# Patient Record
Sex: FEMALE
[De-identification: names, ages, dates, MRNs, and addresses within clinical notes are randomized per-mention and may not be internally consistent; named-entity substitution may affect disease eponyms.]

---

## 2018-09-10 ENCOUNTER — HOSPITAL ENCOUNTER (OUTPATIENT)
Dept: HOSPITAL 9 - MADCT | Age: 58
Discharge: HOME | End: 2018-09-10
Payer: COMMERCIAL

## 2018-09-10 DIAGNOSIS — M25.552: ICD-10-CM

## 2018-09-10 DIAGNOSIS — M53.3: ICD-10-CM

## 2018-09-10 DIAGNOSIS — Z85.3: ICD-10-CM

## 2018-09-10 DIAGNOSIS — M25.512: Primary | ICD-10-CM

## 2018-09-10 DIAGNOSIS — M54.9: ICD-10-CM

## 2018-09-10 PROCEDURE — 71260 CT THORAX DX C+: CPT

## 2018-09-10 PROCEDURE — 74177 CT ABD & PELVIS W/CONTRAST: CPT

## 2018-09-10 NOTE — CT
CT CHEST WITH CONTRAST:

 

CT ABDOMEN AND PELVIS WITH CONTRAST:

 

HISTORY:

Breast cancer.  Left hip and back pain.  Shoulder pain.  Evaluate for recurrent disease.

 

TECHNIQUE:

Multiple contiguous axial images were obtained in a CT of the chest with contrast.  Sagittal and manan
nal reformats were performed.

 

Multiple contiguous axial images were obtained in a CT of the abdomen and pelvis with contrast.  Sagi
ttal and coronal reformats were performed.

 

FINDINGS:

CHEST:  The heart is normal in size.  No hilar or mediastinal lymphadenopathy is seen.

 

No suspicious pulmonary nodules are seen.  No focal infiltrates are seen.  No pneumothorax or pleural
 effusion is present.

 

The patient has a left breast implant.  Surgical clips are seen in the left axillary region.  No enla
rged axillary lymph nodes are seen.  Degenerative changes are seen in the spine.  No suspicious lesio
ns are seen in the bones of the thorax.

 

ABDOMEN AND PELVIS:  The liver, gallbladder, kidneys, adrenal glands, spleen, and pancreas are unrema
rkable.  No free air, free fluid, or stranding changes are seen in the abdomen or pelvis.

 

The reproductive organs are unremarkable.  The large and small bowel are unremarkable.  No abdominal 
or pelvic lymphadenopathy is seen.  Atherosclerotic calcifications are seen in the aorta.

 

There is a subcentimeter small, sclerotic lesion in the left sacrum.  This may represent a bone islan
d, but an isolated metastatic lesion cannot be excluded.  Mild degenerative changes are seen in the l
umbar spine.  The abdominal wall soft tissues are unremarkable.

 

IMPRESSION:

1.  No evidence of intrathoracic metastatic disease or acute intrathoracic abnormality.

 

2.  No evidence of acute intraabdominal abnormality.

 

3.  Nonspecific small sclerotic lesion at the left aspect of the sacrum. Although this most likely re
presents a bone island, an isolated solitary metastatic lesion to the bone is still a consideration. 
 A bone scan may be necessary to evaluate for activity within this lesion.

 

POS: OMAR

## 2020-01-25 ENCOUNTER — HOSPITAL ENCOUNTER (EMERGENCY)
Dept: HOSPITAL 9 - MADERS | Age: 60
LOS: 1 days | Discharge: HOME | End: 2020-01-26
Payer: COMMERCIAL

## 2020-01-25 DIAGNOSIS — E11.9: ICD-10-CM

## 2020-01-25 DIAGNOSIS — Z79.84: ICD-10-CM

## 2020-01-25 DIAGNOSIS — I10: ICD-10-CM

## 2020-01-25 DIAGNOSIS — Z79.899: ICD-10-CM

## 2020-01-25 DIAGNOSIS — E86.9: Primary | ICD-10-CM

## 2020-01-25 DIAGNOSIS — R11.2: ICD-10-CM

## 2020-01-25 LAB
ALBUMIN SERPL BCG-MCNC: 4.5 G/DL (ref 3.5–5)
ALP SERPL-CCNC: 111 U/L (ref 40–110)
ALT SERPL W P-5'-P-CCNC: 29 U/L (ref 8–55)
ANION GAP SERPL CALC-SCNC: 19 MMOL/L (ref 10–20)
AST SERPL-CCNC: 21 U/L (ref 5–34)
BACTERIA UR QL AUTO: (no result) HPF
BASOPHILS # BLD AUTO: 0.1 THOU/UL (ref 0–0.2)
BASOPHILS NFR BLD AUTO: 0.8 % (ref 0–1)
BILIRUB SERPL-MCNC: 0.4 MG/DL (ref 0.2–1.2)
BUN SERPL-MCNC: 23 MG/DL (ref 9.8–20.1)
CALCIUM SERPL-MCNC: 10.4 MG/DL (ref 7.8–10.44)
CHLORIDE SERPL-SCNC: 100 MMOL/L (ref 98–107)
CO2 SERPL-SCNC: 28 MMOL/L (ref 22–29)
CREAT CL PREDICTED SERPL C-G-VRATE: 0 ML/MIN (ref 70–130)
EOSINOPHIL # BLD AUTO: 0.2 THOU/UL (ref 0–0.7)
EOSINOPHIL NFR BLD AUTO: 2 % (ref 0–10)
GLOBULIN SER CALC-MCNC: 3.8 G/DL (ref 2.4–3.5)
GLUCOSE SERPL-MCNC: 208 MG/DL (ref 70–105)
GLUCOSE UR STRIP-MCNC: 500 MG/DL
HGB BLD-MCNC: 13.2 G/DL (ref 12–16)
LIPASE SERPL-CCNC: 48 U/L (ref 8–78)
LYMPHOCYTES # BLD AUTO: 3.6 THOU/UL (ref 1.2–3.4)
LYMPHOCYTES NFR BLD AUTO: 30.3 % (ref 21–51)
MCH RBC QN AUTO: 30.7 PG (ref 27–31)
MCV RBC AUTO: 95.5 FL (ref 78–98)
MONOCYTES # BLD AUTO: 0.7 THOU/UL (ref 0.11–0.59)
MONOCYTES NFR BLD AUTO: 6.2 % (ref 0–10)
MUCOUS THREADS UR QL AUTO: (no result) LPF
NEUTROPHILS # BLD AUTO: 7.1 THOU/UL (ref 1.4–6.5)
NEUTROPHILS NFR BLD AUTO: 60.7 % (ref 42–75)
PLATELET # BLD AUTO: 340 THOU/UL (ref 130–400)
POTASSIUM SERPL-SCNC: 4.9 MMOL/L (ref 3.5–5.1)
RBC # BLD AUTO: 4.29 MILL/UL (ref 4.2–5.4)
RBC UR QL AUTO: (no result) HPF (ref 0–3)
SODIUM SERPL-SCNC: 142 MMOL/L (ref 136–145)
WBC # BLD AUTO: 11.7 THOU/UL (ref 4.8–10.8)
WBC UR QL AUTO: (no result) HPF (ref 0–3)

## 2020-01-25 PROCEDURE — 93005 ELECTROCARDIOGRAM TRACING: CPT

## 2020-01-25 PROCEDURE — 81015 MICROSCOPIC EXAM OF URINE: CPT

## 2020-01-25 PROCEDURE — 96361 HYDRATE IV INFUSION ADD-ON: CPT

## 2020-01-25 PROCEDURE — 81003 URINALYSIS AUTO W/O SCOPE: CPT

## 2020-01-25 PROCEDURE — 96374 THER/PROPH/DIAG INJ IV PUSH: CPT

## 2020-01-25 PROCEDURE — C9113 INJ PANTOPRAZOLE SODIUM, VIA: HCPCS

## 2020-01-25 PROCEDURE — 83690 ASSAY OF LIPASE: CPT

## 2020-01-25 PROCEDURE — 84484 ASSAY OF TROPONIN QUANT: CPT

## 2020-01-25 PROCEDURE — 85025 COMPLETE CBC W/AUTO DIFF WBC: CPT

## 2020-01-25 PROCEDURE — 80053 COMPREHEN METABOLIC PANEL: CPT

## 2020-01-25 PROCEDURE — 83605 ASSAY OF LACTIC ACID: CPT

## 2020-08-11 ENCOUNTER — HOSPITAL ENCOUNTER (EMERGENCY)
Dept: HOSPITAL 9 - MADERS | Age: 60
Discharge: HOME | End: 2020-08-11
Payer: COMMERCIAL

## 2020-08-11 DIAGNOSIS — R11.2: ICD-10-CM

## 2020-08-11 DIAGNOSIS — E11.9: ICD-10-CM

## 2020-08-11 DIAGNOSIS — R10.13: Primary | ICD-10-CM

## 2020-08-11 DIAGNOSIS — Z79.84: ICD-10-CM

## 2020-08-11 DIAGNOSIS — I10: ICD-10-CM

## 2020-08-11 DIAGNOSIS — Z79.899: ICD-10-CM

## 2020-08-11 LAB
ALBUMIN SERPL BCG-MCNC: 3.9 G/DL (ref 3.5–5)
ALP SERPL-CCNC: 96 U/L (ref 40–110)
ALT SERPL W P-5'-P-CCNC: 31 U/L (ref 8–55)
ANION GAP SERPL CALC-SCNC: 16 MMOL/L (ref 10–20)
AST SERPL-CCNC: 26 U/L (ref 5–34)
BASOPHILS # BLD AUTO: 0.1 THOU/UL (ref 0–0.2)
BASOPHILS NFR BLD AUTO: 0.8 % (ref 0–1)
BILIRUB SERPL-MCNC: 0.3 MG/DL (ref 0.2–1.2)
BUN SERPL-MCNC: 21 MG/DL (ref 9.8–20.1)
CALCIUM SERPL-MCNC: 9.3 MG/DL (ref 7.8–10.44)
CHLORIDE SERPL-SCNC: 102 MMOL/L (ref 98–107)
CO2 SERPL-SCNC: 25 MMOL/L (ref 22–29)
CREAT CL PREDICTED SERPL C-G-VRATE: 0 ML/MIN (ref 70–130)
EOSINOPHIL # BLD AUTO: 0.1 THOU/UL (ref 0–0.7)
EOSINOPHIL NFR BLD AUTO: 1.7 % (ref 0–10)
GLOBULIN SER CALC-MCNC: 3.5 G/DL (ref 2.4–3.5)
GLUCOSE SERPL-MCNC: 304 MG/DL (ref 70–105)
HGB BLD-MCNC: 12 G/DL (ref 12–16)
LIPASE SERPL-CCNC: 50 U/L (ref 8–78)
LYMPHOCYTES # BLD AUTO: 2.7 THOU/UL (ref 1.2–3.4)
LYMPHOCYTES NFR BLD AUTO: 30.8 % (ref 21–51)
MCH RBC QN AUTO: 30 PG (ref 27–31)
MCV RBC AUTO: 94.5 FL (ref 78–98)
MONOCYTES # BLD AUTO: 0.5 THOU/UL (ref 0.11–0.59)
MONOCYTES NFR BLD AUTO: 6.1 % (ref 0–10)
NEUTROPHILS # BLD AUTO: 5.3 THOU/UL (ref 1.4–6.5)
NEUTROPHILS NFR BLD AUTO: 60.7 % (ref 42–75)
PLATELET # BLD AUTO: 285 THOU/UL (ref 130–400)
POTASSIUM SERPL-SCNC: 4.4 MMOL/L (ref 3.5–5.1)
RBC # BLD AUTO: 4 MILL/UL (ref 4.2–5.4)
SODIUM SERPL-SCNC: 139 MMOL/L (ref 136–145)
WBC # BLD AUTO: 8.7 THOU/UL (ref 4.8–10.8)

## 2020-08-11 PROCEDURE — 96365 THER/PROPH/DIAG IV INF INIT: CPT

## 2020-08-11 PROCEDURE — 74022 RADEX COMPL AQT ABD SERIES: CPT

## 2020-08-11 PROCEDURE — 84484 ASSAY OF TROPONIN QUANT: CPT

## 2020-08-11 PROCEDURE — 96375 TX/PRO/DX INJ NEW DRUG ADDON: CPT

## 2020-08-11 PROCEDURE — 93005 ELECTROCARDIOGRAM TRACING: CPT

## 2020-08-11 PROCEDURE — 85025 COMPLETE CBC W/AUTO DIFF WBC: CPT

## 2020-08-11 PROCEDURE — 80053 COMPREHEN METABOLIC PANEL: CPT

## 2020-08-11 PROCEDURE — 83690 ASSAY OF LIPASE: CPT

## 2020-08-11 NOTE — RAD
XR Abdomen 2 View/1 View Cxr



History: Abdominal pain



Comparison: None.



Findings: Lungs are clear. No pneumothorax. No effusion.



No dilated air-filled loops of large or small bowel. On the upright view no free air is seen under th
e left hemidiaphragm. The right hemidiaphragm is not interrogated on the upright abdominal

radiograph.



No abnormal calcifications are seen projecting over the renal shadows. Moderate degeneration of the p
ubic symphysis.



Impression: No acute intrathoracic nor intra-abdominal abnormality.



Reported By: Jarrett Cervantes 

Electronically Signed:  8/11/2020 7:51 AM

## 2020-08-18 ENCOUNTER — HOSPITAL ENCOUNTER (EMERGENCY)
Dept: HOSPITAL 9 - MADERS | Age: 60
Discharge: HOME | End: 2020-08-18
Payer: COMMERCIAL

## 2020-08-18 DIAGNOSIS — E11.43: Primary | ICD-10-CM

## 2020-08-18 DIAGNOSIS — Z79.84: ICD-10-CM

## 2020-08-18 DIAGNOSIS — K31.84: ICD-10-CM

## 2020-08-18 DIAGNOSIS — I10: ICD-10-CM

## 2020-08-18 DIAGNOSIS — Z79.899: ICD-10-CM

## 2020-08-18 PROCEDURE — 96372 THER/PROPH/DIAG INJ SC/IM: CPT

## 2020-08-18 PROCEDURE — 99283 EMERGENCY DEPT VISIT LOW MDM: CPT

## 2021-03-17 ENCOUNTER — HOSPITAL ENCOUNTER (OUTPATIENT)
Dept: HOSPITAL 9 - MADLAB | Age: 61
Discharge: HOME | End: 2021-03-17
Attending: FAMILY MEDICINE
Payer: COMMERCIAL

## 2021-03-17 DIAGNOSIS — E78.00: Primary | ICD-10-CM

## 2021-03-17 DIAGNOSIS — E11.9: ICD-10-CM

## 2021-03-17 LAB
ALBUMIN SERPL BCG-MCNC: 4.1 G/DL (ref 3.4–4.8)
ALP SERPL-CCNC: 81 U/L (ref 40–110)
ALT SERPL W P-5'-P-CCNC: 22 U/L (ref 8–55)
ANION GAP SERPL CALC-SCNC: 15 MMOL/L (ref 10–20)
AST SERPL-CCNC: 20 U/L (ref 5–34)
BILIRUB SERPL-MCNC: 0.6 MG/DL (ref 0.2–1.2)
BUN SERPL-MCNC: 33 MG/DL (ref 9.8–20.1)
CALCIUM SERPL-MCNC: 9.3 MG/DL (ref 7.8–10.44)
CHD RISK SERPL-RTO: 2.7 (ref ?–4.5)
CHLORIDE SERPL-SCNC: 105 MMOL/L (ref 98–107)
CHOLEST SERPL-MCNC: 134 MG/DL
CO2 SERPL-SCNC: 25 MMOL/L (ref 23–31)
CREAT CL PREDICTED SERPL C-G-VRATE: 0 ML/MIN (ref 70–130)
GLOBULIN SER CALC-MCNC: 3.3 G/DL (ref 2.4–3.5)
GLUCOSE SERPL-MCNC: 126 MG/DL (ref 80–115)
HDLC SERPL-MCNC: 49 MG/DL
LDLC SERPL CALC-MCNC: 67 MG/DL
POTASSIUM SERPL-SCNC: 4.7 MMOL/L (ref 3.5–5.1)
SODIUM SERPL-SCNC: 140 MMOL/L (ref 136–145)
TRIGL SERPL-MCNC: 90 MG/DL (ref ?–150)

## 2021-03-17 PROCEDURE — 80053 COMPREHEN METABOLIC PANEL: CPT

## 2021-03-17 PROCEDURE — 83036 HEMOGLOBIN GLYCOSYLATED A1C: CPT

## 2021-03-17 PROCEDURE — 80061 LIPID PANEL: CPT

## 2021-03-17 PROCEDURE — 36415 COLL VENOUS BLD VENIPUNCTURE: CPT

## 2022-01-18 ENCOUNTER — HOSPITAL ENCOUNTER (OUTPATIENT)
Dept: HOSPITAL 9 - MADRAD | Age: 62
Discharge: HOME | End: 2022-01-18
Attending: FAMILY MEDICINE
Payer: COMMERCIAL

## 2022-01-18 DIAGNOSIS — M25.561: Primary | ICD-10-CM

## 2022-01-18 DIAGNOSIS — M25.461: ICD-10-CM
